# Patient Record
Sex: FEMALE | Race: WHITE | URBAN - METROPOLITAN AREA
[De-identification: names, ages, dates, MRNs, and addresses within clinical notes are randomized per-mention and may not be internally consistent; named-entity substitution may affect disease eponyms.]

---

## 2019-08-20 ENCOUNTER — APPOINTMENT (RX ONLY)
Dept: URBAN - METROPOLITAN AREA CLINIC 44 | Facility: CLINIC | Age: 39
Setting detail: DERMATOLOGY
End: 2019-08-20

## 2019-08-20 DIAGNOSIS — L50.3 DERMATOGRAPHIC URTICARIA: ICD-10-CM

## 2019-08-20 DIAGNOSIS — L23.9 ALLERGIC CONTACT DERMATITIS, UNSPECIFIED CAUSE: ICD-10-CM

## 2019-08-20 PROCEDURE — 99202 OFFICE O/P NEW SF 15 MIN: CPT

## 2019-08-20 PROCEDURE — ? PRESCRIPTION MEDICATION MANAGEMENT

## 2019-08-20 PROCEDURE — ? COUNSELING

## 2019-08-20 PROCEDURE — ? MEDICATION COUNSELING

## 2019-08-20 PROCEDURE — ? INVENTORY

## 2019-08-20 PROCEDURE — ? PRESCRIPTION

## 2019-08-20 RX ORDER — TRIAMCINOLONE ACETONIDE 1 MG/G
CREAM TOPICAL BID
Qty: 1 | Refills: 0 | Status: ERX | COMMUNITY
Start: 2019-08-20

## 2019-08-20 RX ORDER — PREDNISONE 10 MG/1
TABLET ORAL QAM
Qty: 60 | Refills: 0 | Status: ERX | COMMUNITY
Start: 2019-08-20

## 2019-08-20 RX ADMIN — PREDNISONE: 10 TABLET ORAL at 15:19

## 2019-08-20 RX ADMIN — TRIAMCINOLONE ACETONIDE: 1 CREAM TOPICAL at 15:16

## 2019-08-20 ASSESSMENT — LOCATION DETAILED DESCRIPTION DERM
LOCATION DETAILED: INFERIOR THORACIC SPINE
LOCATION DETAILED: LEFT INFERIOR UPPER BACK

## 2019-08-20 ASSESSMENT — LOCATION SIMPLE DESCRIPTION DERM
LOCATION SIMPLE: LEFT UPPER BACK
LOCATION SIMPLE: UPPER BACK

## 2019-08-20 ASSESSMENT — LOCATION ZONE DERM: LOCATION ZONE: TRUNK

## 2019-08-20 NOTE — HPI: RASH
What Type Of Note Output Would You Prefer (Optional)?: Bullet Format
How Severe Is Your Rash?: moderate
Is This A New Presentation, Or A Follow-Up?: Rash
Additional History: The heat and caffeine irritate rash.  No personal or family history of psoriasis or eczema

## 2019-08-20 NOTE — PROCEDURE: MIPS QUALITY
Quality 402: Tobacco Use And Help With Quitting Among Adolescents: Patient screened for tobacco and is a smoker AND received Cessation Counseling
Quality 431: Preventive Care And Screening: Unhealthy Alcohol Use - Screening: Patient screened for unhealthy alcohol use using a single question and scores less than 2 times per year
Quality 226: Preventive Care And Screening: Tobacco Use: Screening And Cessation Intervention: Patient screened for tobacco use, is a smoker AND received Cessation Counseling
Detail Level: Detailed

## 2019-08-20 NOTE — PROCEDURE: MEDICATION COUNSELING
Xelmaxwellz Pregnancy And Lactation Text: This medication is Pregnancy Category D and is not considered safe during pregnancy.  The risk during breast feeding is also uncertain.

## 2019-09-24 ENCOUNTER — APPOINTMENT (RX ONLY)
Dept: URBAN - METROPOLITAN AREA CLINIC 44 | Facility: CLINIC | Age: 39
Setting detail: DERMATOLOGY
End: 2019-09-24

## 2019-09-24 DIAGNOSIS — L50.8 OTHER URTICARIA: ICD-10-CM | Status: INADEQUATELY CONTROLLED

## 2019-09-24 DIAGNOSIS — L23.9 ALLERGIC CONTACT DERMATITIS, UNSPECIFIED CAUSE: ICD-10-CM | Status: RESOLVED

## 2019-09-24 PROCEDURE — ? COUNSELING

## 2019-09-24 PROCEDURE — 99213 OFFICE O/P EST LOW 20 MIN: CPT

## 2019-09-24 PROCEDURE — ? ADDITIONAL NOTES

## 2019-09-24 PROCEDURE — ? TREATMENT REGIMEN

## 2019-09-24 ASSESSMENT — LOCATION SIMPLE DESCRIPTION DERM
LOCATION SIMPLE: LOWER BACK
LOCATION SIMPLE: LEFT UPPER BACK

## 2019-09-24 ASSESSMENT — LOCATION ZONE DERM: LOCATION ZONE: TRUNK

## 2019-09-24 ASSESSMENT — LOCATION DETAILED DESCRIPTION DERM
LOCATION DETAILED: SUPERIOR LUMBAR SPINE
LOCATION DETAILED: LEFT INFERIOR MEDIAL UPPER BACK

## 2019-09-24 NOTE — PROCEDURE: ADDITIONAL NOTES
Additional Notes: Patient is interested in lab work up. Pt does want lab work done but is going to check with her insurance to see how much it costs and will make an appointment for her labwork. Will order basic work up Alda, cbc with diff, hpylori, ige level, tpo ab, thyroglobulin
Detail Level: Simple

## 2021-09-23 NOTE — PROCEDURE: MEDICATION COUNSELING
No Minoxidil Counseling: Minoxidil is a topical medication which can increase blood flow where it is applied. It is uncertain how this medication increases hair growth. Side effects are uncommon and include stinging and allergic reactions.

## 2022-03-11 ENCOUNTER — OFFICE VISIT (OUTPATIENT)
Dept: URBAN - NONMETROPOLITAN AREA CLINIC 4 | Facility: CLINIC | Age: 42
End: 2022-03-11
Payer: COMMERCIAL

## 2022-03-11 ENCOUNTER — WEB ENCOUNTER (OUTPATIENT)
Dept: URBAN - NONMETROPOLITAN AREA CLINIC 4 | Facility: CLINIC | Age: 42
End: 2022-03-11

## 2022-03-11 ENCOUNTER — LAB OUTSIDE AN ENCOUNTER (OUTPATIENT)
Dept: URBAN - NONMETROPOLITAN AREA CLINIC 4 | Facility: CLINIC | Age: 42
End: 2022-03-11

## 2022-03-11 VITALS
BODY MASS INDEX: 25.24 KG/M2 | SYSTOLIC BLOOD PRESSURE: 133 MMHG | WEIGHT: 125.2 LBS | DIASTOLIC BLOOD PRESSURE: 82 MMHG | HEART RATE: 85 BPM | TEMPERATURE: 97.9 F | HEIGHT: 59 IN

## 2022-03-11 DIAGNOSIS — R13.10 DYSPHAGIA, UNSPECIFIED TYPE: ICD-10-CM

## 2022-03-11 DIAGNOSIS — R10.33 PERIUMBILICAL PAIN: ICD-10-CM

## 2022-03-11 DIAGNOSIS — R63.0 POOR APPETITE: ICD-10-CM

## 2022-03-11 DIAGNOSIS — R11.0 NAUSEA: ICD-10-CM

## 2022-03-11 DIAGNOSIS — K76.0 FATTY LIVER: ICD-10-CM

## 2022-03-11 PROBLEM — 197321007: Status: ACTIVE | Noted: 2022-03-11

## 2022-03-11 PROBLEM — 64379006: Status: ACTIVE | Noted: 2022-03-11

## 2022-03-11 PROCEDURE — 99204 OFFICE O/P NEW MOD 45 MIN: CPT | Performed by: REGISTERED NURSE

## 2022-03-11 RX ORDER — SUCRALFATE 1 G/1
1 TABLET ON AN EMPTY STOMACH TABLET ORAL
Qty: 120 TABLET | Refills: 0 | OUTPATIENT
Start: 2022-03-11 | End: 2022-04-10

## 2022-03-11 RX ORDER — OMEPRAZOLE 20 MG/1
1 CAPSULE 30 MINUTES BEFORE MORNING MEAL CAPSULE, DELAYED RELEASE ORAL ONCE A DAY
OUTPATIENT

## 2022-03-11 RX ORDER — FAMOTIDINE 10 MG/1
1 TABLET AS NEEDED TABLET, FILM COATED ORAL TWICE A DAY
Status: ACTIVE | COMMUNITY

## 2022-03-11 RX ORDER — FAMOTIDINE 10 MG/1
1 TABLET AS NEEDED TABLET, FILM COATED ORAL TWICE A DAY
OUTPATIENT

## 2022-03-11 RX ORDER — PANTOPRAZOLE SODIUM 20 MG/1
1 TABLET TABLET, DELAYED RELEASE ORAL TWICE A DAY
Qty: 60 TABLET | Refills: 1 | OUTPATIENT
Start: 2022-03-11

## 2022-03-11 RX ORDER — OMEPRAZOLE 20 MG/1
1 CAPSULE 30 MINUTES BEFORE MORNING MEAL CAPSULE, DELAYED RELEASE ORAL ONCE A DAY
Status: ACTIVE | COMMUNITY

## 2022-03-11 NOTE — PHYSICAL EXAM GASTROINTESTINAL
Abdomen , soft, moderate TTP in periumbilical region, nondistended , no guarding or rigidity , no masses palpable , normal bowel sounds , Liver and Spleen , no hepatomegaly present , no hepatosplenomegaly , liver nontender , spleen not palpable

## 2022-03-11 NOTE — HPI-TODAY'S VISIT:
3/11/22: Pt is a 40 yo female with PMH of anxiety and depression who is self referred for evaluation of abdominal pain.   Pt reports periumbilical abdominal pain since November. Pain is sharp, burning, aching and cramping in nature. Pain is intermittent. Eating and lying down makes pain better. Seen by UC in Oct/Nov and was prescribed omeprazole 20 mg, which seemed to help at first, but symptoms progressively worsened. She was seen in ED in Dec 2021 with same complaints. Vital stable, labs wnl, CT without acute concerns, only mild fatty infiltration.  Patient had improvement after the GI cocktail and Pepcid. She was precribed Protonix 20 mg BID and Carafate QID, which helped, but she ran out. She continues to c/o periumbilical pain with associated nausea, loss of appetite and dysphagia to solids and pills. Denies vomiting, melena or hematochezia. Currently takes Pepcid AC 20 mg and omeprazole 20 mg daily in AM. Takes Pepcid AC 10 mg in PM. She has never had an EGD.

## 2022-03-14 ENCOUNTER — TELEPHONE ENCOUNTER (OUTPATIENT)
Dept: URBAN - NONMETROPOLITAN AREA CLINIC 4 | Facility: CLINIC | Age: 42
End: 2022-03-14

## 2022-03-15 PROBLEM — 40739000: Status: ACTIVE | Noted: 2022-03-11

## 2022-03-21 ENCOUNTER — OFFICE VISIT (OUTPATIENT)
Dept: URBAN - METROPOLITAN AREA SURGERY CENTER 14 | Facility: SURGERY CENTER | Age: 42
End: 2022-03-21

## 2022-03-28 ENCOUNTER — OFFICE VISIT (OUTPATIENT)
Dept: URBAN - METROPOLITAN AREA SURGERY CENTER 14 | Facility: SURGERY CENTER | Age: 42
End: 2022-03-28

## 2022-04-05 ENCOUNTER — OFFICE VISIT (OUTPATIENT)
Dept: URBAN - NONMETROPOLITAN AREA CLINIC 4 | Facility: CLINIC | Age: 42
End: 2022-04-05

## 2023-09-15 ENCOUNTER — WEB ENCOUNTER (OUTPATIENT)
Dept: URBAN - NONMETROPOLITAN AREA CLINIC 4 | Facility: CLINIC | Age: 43
End: 2023-09-15

## 2023-09-15 ENCOUNTER — TELEPHONE ENCOUNTER (OUTPATIENT)
Dept: URBAN - NONMETROPOLITAN AREA CLINIC 4 | Facility: CLINIC | Age: 43
End: 2023-09-15

## 2023-09-15 ENCOUNTER — LAB OUTSIDE AN ENCOUNTER (OUTPATIENT)
Dept: URBAN - NONMETROPOLITAN AREA CLINIC 4 | Facility: CLINIC | Age: 43
End: 2023-09-15

## 2023-09-15 ENCOUNTER — OFFICE VISIT (OUTPATIENT)
Dept: URBAN - NONMETROPOLITAN AREA CLINIC 4 | Facility: CLINIC | Age: 43
End: 2023-09-15
Payer: COMMERCIAL

## 2023-09-15 VITALS
HEIGHT: 59 IN | SYSTOLIC BLOOD PRESSURE: 127 MMHG | WEIGHT: 112 LBS | HEART RATE: 77 BPM | TEMPERATURE: 97.9 F | DIASTOLIC BLOOD PRESSURE: 77 MMHG | BODY MASS INDEX: 22.58 KG/M2

## 2023-09-15 DIAGNOSIS — R63.0 POOR APPETITE: ICD-10-CM

## 2023-09-15 DIAGNOSIS — F12.90 MARIJUANA USE: ICD-10-CM

## 2023-09-15 DIAGNOSIS — R19.8 ALTERNATING CONSTIPATION AND DIARRHEA: ICD-10-CM

## 2023-09-15 DIAGNOSIS — R11.0 NAUSEA: ICD-10-CM

## 2023-09-15 DIAGNOSIS — R10.33 PERIUMBILICAL PAIN: ICD-10-CM

## 2023-09-15 DIAGNOSIS — K76.0 FATTY LIVER: ICD-10-CM

## 2023-09-15 PROCEDURE — 99214 OFFICE O/P EST MOD 30 MIN: CPT | Performed by: REGISTERED NURSE

## 2023-09-15 RX ORDER — HYOSCYAMINE SULFATE 0.12 MG/1
1 TABLET AS NEEDED TABLET ORAL
Qty: 180 TABLET | Refills: 1
Start: 2023-09-15

## 2023-09-15 RX ORDER — HYOSCYAMINE SULFATE 0.12 MG/1
1 TABLET AS NEEDED TABLET ORAL
OUTPATIENT
Start: 2023-09-15

## 2023-09-15 RX ORDER — PANTOPRAZOLE SODIUM 20 MG/1
1 TABLET TABLET, DELAYED RELEASE ORAL TWICE A DAY
Qty: 60 TABLET | Refills: 1 | Status: DISCONTINUED | COMMUNITY
Start: 2022-03-11

## 2023-09-15 RX ORDER — SODIUM, POTASSIUM,MAG SULFATES 17.5-3.13G
AS DIRECTED SOLUTION, RECONSTITUTED, ORAL ORAL
Qty: 1 | Refills: 0 | OUTPATIENT
Start: 2023-09-15 | End: 2023-09-17

## 2023-09-21 ENCOUNTER — TELEPHONE ENCOUNTER (OUTPATIENT)
Dept: URBAN - NONMETROPOLITAN AREA CLINIC 4 | Facility: CLINIC | Age: 43
End: 2023-09-21

## 2023-09-21 RX ORDER — DICYCLOMINE HYDROCHLORIDE 20 MG/1
1 TABLET TABLET ORAL THREE TIMES A DAY
Qty: 90 | Refills: 1 | OUTPATIENT
Start: 2023-09-22 | End: 2023-10-22

## 2023-09-29 ENCOUNTER — TELEPHONE ENCOUNTER (OUTPATIENT)
Dept: URBAN - NONMETROPOLITAN AREA CLINIC 4 | Facility: CLINIC | Age: 43
End: 2023-09-29

## 2023-09-29 RX ORDER — PANTOPRAZOLE SODIUM 20 MG/1
1 TABLET TABLET, DELAYED RELEASE ORAL TWICE A DAY
Qty: 60 TABLET | Refills: 1
Start: 2022-03-11

## 2023-09-29 RX ORDER — SUCRALFATE 1 G/1
1 TABLET ON AN EMPTY STOMACH TABLET ORAL
Qty: 120 TABLET | Refills: 0
Start: 2022-03-11 | End: 2023-11-01

## 2023-10-02 ENCOUNTER — TELEPHONE ENCOUNTER (OUTPATIENT)
Dept: URBAN - METROPOLITAN AREA CLINIC 54 | Facility: CLINIC | Age: 43
End: 2023-10-02

## 2023-10-17 ENCOUNTER — TELEPHONE ENCOUNTER (OUTPATIENT)
Dept: URBAN - METROPOLITAN AREA CLINIC 54 | Facility: CLINIC | Age: 43
End: 2023-10-17

## 2023-10-17 ENCOUNTER — CLAIMS CREATED FROM THE CLAIM WINDOW (OUTPATIENT)
Dept: URBAN - METROPOLITAN AREA SURGERY CENTER 14 | Facility: SURGERY CENTER | Age: 43
End: 2023-10-17
Payer: COMMERCIAL

## 2023-10-17 ENCOUNTER — CLAIMS CREATED FROM THE CLAIM WINDOW (OUTPATIENT)
Dept: URBAN - METROPOLITAN AREA CLINIC 4 | Facility: CLINIC | Age: 43
End: 2023-10-17
Payer: COMMERCIAL

## 2023-10-17 DIAGNOSIS — K29.50 CHRONIC GASTRITIS WITHOUT BLEEDING: ICD-10-CM

## 2023-10-17 DIAGNOSIS — K64.8 OTHER HEMORRHOIDS: ICD-10-CM

## 2023-10-17 DIAGNOSIS — R10.33 ABDOMINAL PAIN, ACUTE, PERIUMBILICAL: ICD-10-CM

## 2023-10-17 DIAGNOSIS — R10.84 ABDOMINAL CRAMPING, GENERALIZED: ICD-10-CM

## 2023-10-17 DIAGNOSIS — R10.33 PERIUMBILICAL ABDOMINAL PAIN: ICD-10-CM

## 2023-10-17 DIAGNOSIS — K31.89 OTHER DISEASES OF STOMACH AND DUODENUM: ICD-10-CM

## 2023-10-17 DIAGNOSIS — K29.70 GASTRITIS, UNSPECIFIED, WITHOUT BLEEDING: ICD-10-CM

## 2023-10-17 PROCEDURE — 45378 DIAGNOSTIC COLONOSCOPY: CPT | Performed by: INTERNAL MEDICINE

## 2023-10-17 PROCEDURE — 88342 IMHCHEM/IMCYTCHM 1ST ANTB: CPT | Performed by: PATHOLOGY

## 2023-10-17 PROCEDURE — 43239 EGD BIOPSY SINGLE/MULTIPLE: CPT | Performed by: INTERNAL MEDICINE

## 2023-10-17 PROCEDURE — G8907 PT DOC NO EVENTS ON DISCHARG: HCPCS | Performed by: INTERNAL MEDICINE

## 2023-10-17 PROCEDURE — 00813 ANES UPR LWR GI NDSC PX: CPT | Performed by: NURSE ANESTHETIST, CERTIFIED REGISTERED

## 2023-10-17 PROCEDURE — 88305 TISSUE EXAM BY PATHOLOGIST: CPT | Performed by: PATHOLOGY

## 2023-10-17 RX ORDER — PANTOPRAZOLE SODIUM 20 MG/1
1 TABLET TABLET, DELAYED RELEASE ORAL TWICE A DAY
Qty: 60 TABLET | Refills: 1 | Status: ACTIVE | COMMUNITY
Start: 2022-03-11

## 2023-10-17 RX ORDER — SUCRALFATE 1 G/1
1 TABLET ON AN EMPTY STOMACH TABLET ORAL
Qty: 120 TABLET | Refills: 0 | Status: ACTIVE | COMMUNITY
Start: 2022-03-11 | End: 2023-11-01

## 2023-10-17 RX ORDER — HYOSCYAMINE SULFATE 0.12 MG/1
1 TABLET AS NEEDED TABLET ORAL
Qty: 180 TABLET | Refills: 1 | Status: ACTIVE | COMMUNITY
Start: 2023-09-15

## 2023-10-17 RX ORDER — DICYCLOMINE HYDROCHLORIDE 20 MG/1
1 TABLET TABLET ORAL THREE TIMES A DAY
Qty: 90 | Refills: 1 | Status: ACTIVE | COMMUNITY
Start: 2023-09-22 | End: 2023-10-22

## 2023-11-14 ENCOUNTER — DASHBOARD ENCOUNTERS (OUTPATIENT)
Age: 43
End: 2023-11-14

## 2023-11-14 ENCOUNTER — OFFICE VISIT (OUTPATIENT)
Dept: URBAN - NONMETROPOLITAN AREA CLINIC 4 | Facility: CLINIC | Age: 43
End: 2023-11-14
Payer: COMMERCIAL

## 2023-11-14 VITALS
BODY MASS INDEX: 21.21 KG/M2 | WEIGHT: 105.2 LBS | SYSTOLIC BLOOD PRESSURE: 130 MMHG | TEMPERATURE: 97.7 F | HEIGHT: 59 IN | HEART RATE: 79 BPM | DIASTOLIC BLOOD PRESSURE: 84 MMHG

## 2023-11-14 DIAGNOSIS — R19.8 ALTERNATING CONSTIPATION AND DIARRHEA: ICD-10-CM

## 2023-11-14 DIAGNOSIS — R11.0 NAUSEA: ICD-10-CM

## 2023-11-14 DIAGNOSIS — R63.0 POOR APPETITE: ICD-10-CM

## 2023-11-14 DIAGNOSIS — K76.0 FATTY LIVER: ICD-10-CM

## 2023-11-14 DIAGNOSIS — R10.33 PERIUMBILICAL PAIN: ICD-10-CM

## 2023-11-14 DIAGNOSIS — F12.90 MARIJUANA USE: ICD-10-CM

## 2023-11-14 PROCEDURE — 99214 OFFICE O/P EST MOD 30 MIN: CPT | Performed by: REGISTERED NURSE

## 2023-11-14 RX ORDER — HYOSCYAMINE SULFATE 0.12 MG/1
1 TABLET AS NEEDED TABLET ORAL
Qty: 180 TABLET | Refills: 1 | Status: ACTIVE | COMMUNITY
Start: 2023-09-15

## 2023-11-14 RX ORDER — PANTOPRAZOLE SODIUM 20 MG/1
1 TABLET TABLET, DELAYED RELEASE ORAL TWICE A DAY
Qty: 60 TABLET | Refills: 1 | Status: ON HOLD | COMMUNITY
Start: 2022-03-11

## 2023-11-14 NOTE — HPI-TODAY'S VISIT:
3/11/22: Pt is a 40 yo female with PMH of anxiety and depression who is self referred for evaluation of abdominal pain.   Pt reports periumbilical abdominal pain since November. Pain is sharp, burning, aching and cramping in nature. Pain is intermittent. Eating and lying down makes pain better. Seen by  in Oct/Nov and was prescribed omeprazole 20 mg, which seemed to help at first, but symptoms progressively worsened. She was seen in ED in Dec 2021 with same complaints. Vital stable, labs wnl, CT without acute concerns, only mild fatty infiltration.  Patient had improvement after the GI cocktail and Pepcid. She was precribed Protonix 20 mg BID and Carafate QID, which helped, but she ran out. She continues to c/o periumbilical pain with associated nausea, loss of appetite and dysphagia to solids and pills. Denies vomiting, melena or hematochezia. Currently takes Pepcid AC 20 mg and omeprazole 20 mg daily in AM. Takes Pepcid AC 10 mg in PM. She has never had an EGD.  9/15/23: Pt RTC with c/o recurrent periumbilical abdominal pain aboiut 2-3 months ago. Pain is sharp and dull in nature. Pain is not associated with eating. Has intermittent nausea, heartburn, reflux and globus sensation. Denies vomiting. Has intermittent constipation and diarrhea with gas. Denies melena or hematochezia.  Has tried eliminating caffeine and coffee. Admits taking CBD Gummies with some improvement in pain. Takes Bentyl as needed. Had H pylori BT a few weeks ago at  that was negative. Also has migraines. Admits to taking Excedrin and Tylenol arthritis. She works 2 jobs. Feels stressed and anxious. Pt seen at [facility] ED twice in August with similar complaints. CT scan 8/14/23 with no acute intra-abdomnial findings. LFTs, Tbili and Lipase wnl.  11/14/23: Pt RTC for f/u. Had EGD/colon on 10/17/23. EGD findings: - Normal esophagus. - Z-line regular, 40 cm from the incisors. - Erythematous mucosa in the stomach. Biopsied. - Normal examined duodenum. Biopsied. Bx c/w chronic gastritis. Neg for HPY.  Cscope findings: - The entire examined colon is normal. - Internal hemorrhoids. - The examined portion of the ileum was normal. - No specimens collected.  Pt continues to c/o intermittent periumbilical pain. Pain is on the right side of her belly button and radiates across her abdomen and causes cramping. She is taking Levsin prn. Has associated nausea and poor appetite. Has alternating constipation and diarrhea. Started taking a fiber supplement a few days ago. She went to the ED on 10/21/23. Labs revealed a low level positive hCG 47.9. LFTs and lipase wnl. Transvaginal US performed. A definitive intrauterine gestation is not clearly identified. There is a heterogeneous hypoechoic lesion in the posterior uterus measuring 2.1 cm possibly a uterine fibroid. Ovaries without abnormality. No free fluid. RUQ US unremarkable as well. She was discharged home on [place] and Zofran. She has an appt with GYN tomorrow.